# Patient Record
Sex: MALE | Race: WHITE
[De-identification: names, ages, dates, MRNs, and addresses within clinical notes are randomized per-mention and may not be internally consistent; named-entity substitution may affect disease eponyms.]

---

## 2017-07-08 ENCOUNTER — HOSPITAL ENCOUNTER (EMERGENCY)
Dept: HOSPITAL 75 - ER | Age: 27
Discharge: HOME | End: 2017-07-08
Payer: SELF-PAY

## 2017-07-08 VITALS — BODY MASS INDEX: 21.69 KG/M2 | WEIGHT: 135 LBS | HEIGHT: 66 IN

## 2017-07-08 VITALS — SYSTOLIC BLOOD PRESSURE: 104 MMHG | DIASTOLIC BLOOD PRESSURE: 69 MMHG

## 2017-07-08 DIAGNOSIS — Z23: ICD-10-CM

## 2017-07-08 DIAGNOSIS — L55.1: Primary | ICD-10-CM

## 2017-07-08 DIAGNOSIS — F17.210: ICD-10-CM

## 2017-07-08 DIAGNOSIS — F19.90: ICD-10-CM

## 2017-07-08 PROCEDURE — 90715 TDAP VACCINE 7 YRS/> IM: CPT

## 2017-07-08 PROCEDURE — 90471 IMMUNIZATION ADMIN: CPT

## 2017-07-08 PROCEDURE — 99284 EMERGENCY DEPT VISIT MOD MDM: CPT

## 2017-07-08 NOTE — ED INTEGUMENTARY GENERAL
General


Chief Complaint:  Skin/Wound Problems


Stated Complaint:  BLISTERS


Nursing Triage Note:  


Patient reports shooting something up his R upper arm 4 days ago and is still 


feeling like he is burning inside





Allergies and Home Medications


Allergies


Coded Allergies:  


     No Known Drug Allergies (Unverified , 7/8/17)





Home Medications


No Active Prescriptions or Reported Meds





Past Medical-Social-Family Hx


Patient Social History


Alcohol Use:  Denies Use


Recreational Drug Use:  Yes


Drug of Choice:  ANYTHING AND EVERYTHING


Smoking Status:  Current Everyday Smoker


Type Used:  Cigarettes


Recent Foreign Travel:  No


Contact w/Someone Who Travel:  No


Recent Infectious Disease Expo:  No





Physical Exam


Vital Signs





Vital Sign - Last 12Hours








 7/8/17





 16:56


 


Temp 98.2


 


Pulse 100


 


Resp 18


 


B/P (MAP) 104/69


 


Pulse Ox 97


 


O2 Delivery Room Air





Capillary Refill : Less Than 3 Seconds





Progress/Results/Core Measures


Results/Orders


My Orders





Orders - NIVIA PAGAN DO


Dipht,Pertuss([facility]),Tet Adult (Boostrix (7/8/17 17:30)





Vital Signs/I&O





Vital Sign - Last 12Hours








 7/8/17





 16:56


 


Temp 98.2


 


Pulse 100


 


Resp 18


 


B/P (MAP) 104/69


 


Pulse Ox 97


 


O2 Delivery Room Air














Blood Pressure Mean:  81











Departure


Impression


Impression:  


 Primary Impression:  


 Intravenous drug user


 Additional Impressions:  


 Diphtheria-pertussis-tetanus (DPT) vaccination administered at current visit


 Sunburn of second degree


 Sunburn of first degree


Disposition:  01 HOME, SELF-CARE


Condition:  Stable





Departure-Patient Inst.


Referrals:  


NO,LOCAL PHYSICIAN (PCP)


Primary Care Physician


Patient Instructions:  Diphtheria and Tetanus Toxoids, and Acellular Pertussis 

Vaccine, Drug Abuse and Drug Addiction (DC), MRSA (DC), Sunburn (DC)





Add. Discharge Instructions:  


COOL COMPRESSES TO SKIN





FOLLOW UP WITH  FOR FURTHER CARE





All discharge instructions reviewed with patient and/or family. Voiced 

understanding.


Scripts


Sulfamethoxazole/Trimethoprim (Bactrim Ds Tablet) 1 Each Tablet


1 EACH PO BID, #20 TAB


   Prov: NIVIA PAGAN DO         7/8/17











NIVIA PAGAN DO Jul 8, 2017 17:29

## 2019-11-26 ENCOUNTER — HOSPITAL ENCOUNTER (EMERGENCY)
Dept: HOSPITAL 75 - ER FS | Age: 29
Discharge: HOME | End: 2019-11-26
Payer: SELF-PAY

## 2019-11-26 VITALS — DIASTOLIC BLOOD PRESSURE: 89 MMHG | SYSTOLIC BLOOD PRESSURE: 141 MMHG

## 2019-11-26 VITALS — WEIGHT: 147.93 LBS | BODY MASS INDEX: 22.16 KG/M2 | HEIGHT: 68.5 IN

## 2019-11-26 DIAGNOSIS — G25.1: ICD-10-CM

## 2019-11-26 DIAGNOSIS — T50.995A: ICD-10-CM

## 2019-11-26 DIAGNOSIS — H57.89: Primary | ICD-10-CM

## 2019-11-26 DIAGNOSIS — F41.9: ICD-10-CM

## 2019-11-26 PROCEDURE — 99284 EMERGENCY DEPT VISIT MOD MDM: CPT

## 2019-11-26 PROCEDURE — 96372 THER/PROPH/DIAG INJ SC/IM: CPT

## 2019-11-26 NOTE — ED GENERAL
General


Stated Complaint:  ZEE EYE REDNESS; SHAKINESS


Source of Information:  Patient


Exam Limitations:  No Limitations





History of Present Illness


Date Seen by Provider:  Nov 26, 2019


Time Seen by Provider:  12:25


Initial Comments


The patient is a 29-year-old male presents for evaluation of bilateral eye 

redness, bilateral hand redness, shakiness, and anxiety. He states that shortly 

before symptoms began he took an over-the-counter male enhancement pill from a 

gas station. The brand is night bullet and it states that it has several 

ingredients including horny goat weed, ginseng, yohimbe, and others. After 

taking this pill he noticed the eye redness, shakiness, anxiety, and hand 

redness. He took a Benadryl at home with little relief. He also mentions that he

relapsed yesterday and abuse methamphetamines. He is alert and oriented 4, 

anxious, but appears to be in no distress this time. He denies any other 

complaints.


Timing/Duration:  1 Hour


Severity:  Mild


Associated Systoms:  Denies Symptoms





Allergies and Home Medications


Allergies


Coded Allergies:  


     No Known Drug Allergies (Unverified , 7/8/17)





Home Medications


Sulfamethoxazole/Trimethoprim 1 Each Tablet, 1 EACH PO BID


   Prescribed by: NIVIA PAGAN on 7/8/17 8093





Patient Home Medication List


Home Medication List Reviewed:  Yes





Review of Systems


Review of Systems


Constitutional:  no symptoms reported


EENTM:  other (eye redness, no pain)


Respiratory:  no symptoms reported


Cardiovascular:  no symptoms reported


Gastrointestinal:  no symptoms reported


Genitourinary:  no symptoms reported


Musculoskeletal:  no symptoms reported


Skin:  no symptoms reported


Psychiatric/Neurological:  Anxiety


Hematologic/Lymphatic:  No Symptoms Reported


Immunological/Allergic:  no symptoms reported





All Other Systems Reviewed


Negative Unless Noted:  Yes





Past Medical-Social-Family Hx


Past Med/Social Hx:  Reviewed Nursing Past Med/Soc Hx


Patient Social History


Recreational Drug Use:  Yes (meth)


Drug of Choice:  ANYTHING AND EVERYTHING


Type Used:  Cigarettes


Recent Foreign Travel:  No





Immunizations Up To Date


Tetanus Booster (TDap):  Unknown





Past Medical History


Surgeries:  No


Respiratory:  No


Cardiac:  No


Neurological:  No


Genitourinary:  No


Gastrointestinal:  No


Musculoskeletal:  No


Endocrine:  No


HEENT:  No


Cancer:  No


Psychosocial:  No


Integumentary:  No


Blood Disorders:  No





Physical Exam


Vital Signs





Vital Signs - First Documented








 11/26/19





 12:25


 


Temp 37.0


 


Pulse 128


 


Resp 22


 


B/P (MAP) 102/65 (77)


 


Pulse Ox 98


 


O2 Delivery Room Air





Capillary Refill :


Height, Weight, BMI


Height: 5'6.00"


Weight: 135lbs. oz. 61.157267xs;  BMI


Method:Stated


General Appearance:  No Apparent Distress, WD/WN, Anxious


Eyes:  Bilateral Eye Other (b/l conjunctival injection)


HEENT:  PERRL/EOMI, Pharynx Normal


Neck:  Full Range of Motion, Normal Inspection, Non Tender


Respiratory:  Chest Non Tender, Lungs Clear, Normal Breath Sounds, No Accessory 

Muscle Use


Cardiovascular:  Regular Rate, Rhythm, No Edema, No Murmur


Gastrointestinal:  Normal Bowel Sounds, Non Tender, Soft


Back:  Normal Inspection, No CVA Tenderness, No Vertebral Tenderness


Extremity:  Normal Capillary Refill, Non Tender, No Calf Tenderness


Neurologic/Psychiatric:  Alert, Oriented x3, No Motor/Sensory Deficits, CNs II-

XII Norm as Tested


Skin:  Normal Color, Warm/Dry





Progress/Results/Core Measures


Suspected Sepsis


SIRS


Temperature: 


Pulse:  


Respiratory Rate: 


 


Blood Pressure  / 


Mean:





Results/Orders


My Orders





Orders - BERNA KOO DO


Dexamethasone Injection (Decadron Inject (11/26/19 12:30)


Diphenhydramine Tablet (Benadryl Tablet) (11/26/19 12:30)


Famotidine Tablet (Pepcid Tablet) (11/26/19 12:30)


Alprazolam Tablet (Xanax Tablet) (11/26/19 12:30)


Alprazolam Tablet (Xanax Tablet) (11/26/19 12:42)





Medications Given in ED





Current Medications








 Medications  Dose


 Ordered  Sig/Evelia


 Route  Start Time


 Stop Time Status Last Admin


Dose Admin


 


 Alprazolam  1 mg  ONCE  ONCE


 PO  11/26/19 12:30


 11/26/19 12:31 DC 11/26/19 12:54


1 MG


 


 Dexamethasone


 Sodium Phosphate  10 mg  ONCE  ONCE


 IM  11/26/19 12:30


 11/26/19 12:31 DC 11/26/19 12:54


10 MG


 


 Diphenhydramine


 HCl  25 mg  ONCE  ONCE


 PO  11/26/19 12:30


 11/26/19 12:31 DC 11/26/19 12:54


25 MG


 


 Famotidine  20 mg  ONCE  ONCE


 PO  11/26/19 12:30


 11/26/19 12:31 DC 11/26/19 12:54


20 MG








Vital Signs/I&O











 11/26/19





 12:25


 


Temp 37.0


 


Pulse 128


 


Resp 22


 


B/P (MAP) 102/65 (77)


 


Pulse Ox 98


 


O2 Delivery Room Air





Capillary Refill :


Progress Note :  


Progress Note


@1332 - The patient states he is feeling much better at this time and has no new

complaints. Workup fails reveal any emergent pathology. Strongly advised the 

patient not take any more over-the-counter supplements similar to what he took. 

Also advised the patient stop abusing methamphetamines. The patient expresses 

verbal understanding and agreement with the plan and is stable for discharge 

home.





Departure


Impression





   Primary Impression:  


   Drug side effects


Disposition:  01 HOME, SELF-CARE


Condition:  Stable





Departure-Patient Inst.


Decision time for Depature:  13:35


Referrals:  


NO,LOCAL PHYSICIAN (PCP/Family)


Primary Care Physician


Patient Instructions:  Adverse Drug Reactions, Adult (DC)





Add. Discharge Instructions:  


Follow-up with your doctor in the next 1-2 days. Return to the ER for new or 

worsening symptoms. Do not take anymore over-the-counter similar supplements.











BERNA KOO DO              Nov 26, 2019 12:39


POS

## 2021-10-18 ENCOUNTER — HOSPITAL ENCOUNTER (EMERGENCY)
Dept: HOSPITAL 75 - ER FS | Age: 31
Discharge: HOME | End: 2021-10-18
Payer: SELF-PAY

## 2021-10-18 VITALS — WEIGHT: 164.91 LBS | BODY MASS INDEX: 24.99 KG/M2 | HEIGHT: 67.99 IN

## 2021-10-18 VITALS — SYSTOLIC BLOOD PRESSURE: 128 MMHG | DIASTOLIC BLOOD PRESSURE: 72 MMHG

## 2021-10-18 DIAGNOSIS — S50.811A: Primary | ICD-10-CM

## 2021-10-18 DIAGNOSIS — L03.113: ICD-10-CM
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
## 2021-10-18 DIAGNOSIS — F17.210: ICD-10-CM

## 2021-10-18 LAB
ALBUMIN SERPL-MCNC: 4.1 GM/DL (ref 3.2–4.5)
ALP SERPL-CCNC: 71 U/L (ref 40–136)
ALT SERPL-CCNC: 43 U/L (ref 0–55)
APTT BLD: 26 SEC (ref 24–35)
BASOPHILS # BLD AUTO: 0.1 10^3/UL (ref 0–0.1)
BASOPHILS NFR BLD AUTO: 1 % (ref 0–10)
BILIRUB SERPL-MCNC: 0.4 MG/DL (ref 0.1–1)
BUN/CREAT SERPL: 10
CALCIUM SERPL-MCNC: 9.5 MG/DL (ref 8.5–10.1)
CHLORIDE SERPL-SCNC: 100 MMOL/L (ref 98–107)
CO2 SERPL-SCNC: 30 MMOL/L (ref 21–32)
CREAT SERPL-MCNC: 0.9 MG/DL (ref 0.6–1.3)
D DIMER PPP FEU-MCNC: 1.01 UG/ML (ref 0–0.49)
EOSINOPHIL # BLD AUTO: 0.4 10^3/UL (ref 0–0.3)
EOSINOPHIL NFR BLD AUTO: 4 % (ref 0–10)
GFR SERPLBLD BASED ON 1.73 SQ M-ARVRAT: 98 ML/MIN
GLUCOSE SERPL-MCNC: 100 MG/DL (ref 70–105)
HCT VFR BLD CALC: 44 % (ref 40–54)
HGB BLD-MCNC: 15.1 G/DL (ref 13.3–17.7)
INR PPP: 0.9 (ref 0.8–1.4)
LYMPHOCYTES # BLD AUTO: 1.6 X 10^3 (ref 1–4)
LYMPHOCYTES NFR BLD AUTO: 14 % (ref 12–44)
MANUAL DIFFERENTIAL PERFORMED BLD QL: NO
MCH RBC QN AUTO: 29 PG (ref 25–34)
MCHC RBC AUTO-ENTMCNC: 34 G/DL (ref 32–36)
MCV RBC AUTO: 86 FL (ref 80–99)
MONOCYTES # BLD AUTO: 1 X 10^3 (ref 0–1)
MONOCYTES NFR BLD AUTO: 9 % (ref 0–12)
NEUTROPHILS # BLD AUTO: 8 X 10^3 (ref 1.8–7.8)
NEUTROPHILS NFR BLD AUTO: 72 % (ref 42–75)
PLATELET # BLD: 244 10^3/UL (ref 130–400)
PMV BLD AUTO: 11.1 FL (ref 9–12.2)
POTASSIUM SERPL-SCNC: 3.5 MMOL/L (ref 3.6–5)
PROT SERPL-MCNC: 6.9 GM/DL (ref 6.4–8.2)
PROTHROMBIN TIME: 12.9 SEC (ref 12.2–14.7)
SODIUM SERPL-SCNC: 140 MMOL/L (ref 135–145)
WBC # BLD AUTO: 11.1 10^3/UL (ref 4.3–11)

## 2021-10-18 PROCEDURE — 85025 COMPLETE CBC W/AUTO DIFF WBC: CPT

## 2021-10-18 PROCEDURE — 73090 X-RAY EXAM OF FOREARM: CPT

## 2021-10-18 PROCEDURE — 87070 CULTURE OTHR SPECIMN AEROBIC: CPT

## 2021-10-18 PROCEDURE — 87040 BLOOD CULTURE FOR BACTERIA: CPT

## 2021-10-18 PROCEDURE — 85730 THROMBOPLASTIN TIME PARTIAL: CPT

## 2021-10-18 PROCEDURE — 85379 FIBRIN DEGRADATION QUANT: CPT

## 2021-10-18 PROCEDURE — 80053 COMPREHEN METABOLIC PANEL: CPT

## 2021-10-18 PROCEDURE — 36415 COLL VENOUS BLD VENIPUNCTURE: CPT

## 2021-10-18 PROCEDURE — 87205 SMEAR GRAM STAIN: CPT

## 2021-10-18 PROCEDURE — 85610 PROTHROMBIN TIME: CPT

## 2021-10-18 PROCEDURE — 83605 ASSAY OF LACTIC ACID: CPT
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
## 2022-06-12 ENCOUNTER — HOSPITAL ENCOUNTER (EMERGENCY)
Dept: HOSPITAL 75 - ER FS | Age: 32
Discharge: HOME | End: 2022-06-12
Payer: SELF-PAY

## 2022-06-12 VITALS — HEIGHT: 68.9 IN | BODY MASS INDEX: 25.8 KG/M2 | WEIGHT: 174.17 LBS

## 2022-06-12 VITALS — SYSTOLIC BLOOD PRESSURE: 104 MMHG | DIASTOLIC BLOOD PRESSURE: 67 MMHG

## 2022-06-12 DIAGNOSIS — F15.10: ICD-10-CM

## 2022-06-12 DIAGNOSIS — T78.40XA: Primary | ICD-10-CM

## 2022-06-12 PROCEDURE — 99284 EMERGENCY DEPT VISIT MOD MDM: CPT
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
## 2023-05-24 ENCOUNTER — HOSPITAL ENCOUNTER (EMERGENCY)
Dept: HOSPITAL 75 - ER | Age: 33
Discharge: HOME | End: 2023-05-24
Payer: SELF-PAY

## 2023-05-24 VITALS — SYSTOLIC BLOOD PRESSURE: 105 MMHG | DIASTOLIC BLOOD PRESSURE: 70 MMHG

## 2023-05-24 DIAGNOSIS — R07.89: Primary | ICD-10-CM

## 2023-05-24 DIAGNOSIS — F17.210: ICD-10-CM

## 2023-05-24 LAB
ALBUMIN SERPL-MCNC: 4 GM/DL (ref 3.2–4.5)
ALP SERPL-CCNC: 63 U/L (ref 40–136)
ALT SERPL-CCNC: 21 U/L (ref 0–55)
BASOPHILS # BLD AUTO: 0.1 10^3/UL (ref 0–0.1)
BASOPHILS NFR BLD AUTO: 1 % (ref 0–10)
BILIRUB SERPL-MCNC: 0.6 MG/DL (ref 0.1–1)
BUN/CREAT SERPL: 19
CALCIUM SERPL-MCNC: 9.1 MG/DL (ref 8.5–10.1)
CHLORIDE SERPL-SCNC: 103 MMOL/L (ref 98–107)
CO2 SERPL-SCNC: 28 MMOL/L (ref 21–32)
CREAT SERPL-MCNC: 1.13 MG/DL (ref 0.6–1.3)
EOSINOPHIL # BLD AUTO: 1.5 10^3/UL (ref 0–0.3)
EOSINOPHIL NFR BLD AUTO: 14 % (ref 0–10)
EOSINOPHIL NFR BLD MANUAL: 13 %
GFR SERPLBLD BASED ON 1.73 SQ M-ARVRAT: 87 ML/MIN
GLUCOSE SERPL-MCNC: 98 MG/DL (ref 70–105)
HCT VFR BLD CALC: 41 % (ref 40–54)
HGB BLD-MCNC: 14.6 G/DL (ref 13.3–17.7)
LYMPHOCYTES # BLD AUTO: 2.3 10^3/UL (ref 1–4)
LYMPHOCYTES NFR BLD AUTO: 22 % (ref 12–44)
MANUAL DIFFERENTIAL PERFORMED BLD QL: YES
MCH RBC QN AUTO: 30 PG (ref 25–34)
MCHC RBC AUTO-ENTMCNC: 36 G/DL (ref 32–36)
MCV RBC AUTO: 84 FL (ref 80–99)
MONOCYTES # BLD AUTO: 0.9 10^3/UL (ref 0–1)
MONOCYTES NFR BLD AUTO: 9 % (ref 0–12)
MONOCYTES NFR BLD: 6 %
NEUTROPHILS # BLD AUTO: 5.5 10^3/UL (ref 1.8–7.8)
NEUTROPHILS NFR BLD AUTO: 54 % (ref 42–75)
NEUTS BAND NFR BLD MANUAL: 48 %
PLATELET # BLD EST: NORMAL 10*3/UL
PLATELET # BLD: 247 10^3/UL (ref 130–400)
PMV BLD AUTO: 10.6 FL (ref 9–12.2)
POTASSIUM SERPL-SCNC: 3.3 MMOL/L (ref 3.6–5)
PROT SERPL-MCNC: 6.7 GM/DL (ref 6.4–8.2)
RBC MORPH BLD: NORMAL
SODIUM SERPL-SCNC: 140 MMOL/L (ref 135–145)
VARIANT LYMPHS NFR BLD MANUAL: 1 %
VARIANT LYMPHS NFR BLD MANUAL: 32 %
WBC # BLD AUTO: 10.2 10^3/UL (ref 4.3–11)

## 2023-05-24 PROCEDURE — 85007 BL SMEAR W/DIFF WBC COUNT: CPT

## 2023-05-24 PROCEDURE — 84484 ASSAY OF TROPONIN QUANT: CPT

## 2023-05-24 PROCEDURE — 71046 X-RAY EXAM CHEST 2 VIEWS: CPT

## 2023-05-24 PROCEDURE — 36415 COLL VENOUS BLD VENIPUNCTURE: CPT

## 2023-05-24 PROCEDURE — 85027 COMPLETE CBC AUTOMATED: CPT

## 2023-05-24 PROCEDURE — 93005 ELECTROCARDIOGRAM TRACING: CPT

## 2023-05-24 PROCEDURE — 80053 COMPREHEN METABOLIC PANEL: CPT

## 2023-05-24 NOTE — DIAGNOSTIC IMAGING REPORT
INDICATION:  

Chest pain and dyspnea.



TECHNIQUE: 

PA and lateral views of the chest were obtained.



COMPARISON: 

No previous study is available for comparison at this time.



FINDINGS: 

The heart size and pulmonary vasculature are within normal

limits. The lungs are clear bilaterally.  



IMPRESSION: 

Unremarkable chest.   



 



Dictated by: 



  Dictated on workstation # [id number]

## 2023-05-24 NOTE — ED GENERAL
General


Chief Complaint:  Chest Pain


Stated Complaint:  CHEST PAIN


Nursing Triage Note:  


PT AMB TO [facility] WITH CC OF L SIDE CHEST PAIN X 1 WEEK. PT STATES CHEST PAIN COMES




AND GOES AND INCREASES WITH MOVEMENT. DENIES CARDIAC HX. PT REPORTS SOA. PT 


STATES WAS SEEN 2 DAYS AGO AT PCP FOR SAME CONCERN. CHEST PAIN INCREASE WITH 


PALPATATION. PT A&OX4


Source of Information:  Patient


Exam Limitations:  No Limitations





History of Present Illness


Date Seen by Provider:  May 24, 2023


Time Seen by Provider:  16:13


Initial Comments


33-year-old male presents to the emergency department today for chest pain.  He 

states symptoms present for 1 week.  It is sharp stabbing and it feels like 

"something is broke" in his left chest.  He states he fell down preceding the 

event but braced himself with his arm and did not really hit his chest.  He 

denies any fevers or chills.  He was coughing last week but is not coughing now.

 No known cardiac history.  He did see a doctor at the walk-in clinic 2 days 

into his symptoms and he had an EKG which was reportedly normal.





All other systems reviewed and negative except documented per HPI.





Voice recognition software was used to help create this chart





Allergies and Home Medications


Allergies


Coded Allergies:  


     No Known Drug Allergies (Unverified , 7/8/17)





Patient Home Medication List


Home Medication List Reviewed:  Yes


Diphenhydramine HCl (Diphenhydramine HCl) 50 Mg Capsule, 50 MG PO Q4H PRN for 

rash/hives


   Prescribed by: FLACO WANG on 6/12/22 0111


Hydrocodone/Acetaminophen (Hydrocodone-Acetamin 5-325 mg) 1 Each Tablet, 1 TAB 

PO Q6H PRN for PAIN-SEVERE (8-10)


   Prescribed by: FLACO WANG on 10/18/21 1804


Ibuprofen (Ibuprofen) 800 Mg Tablet, 800 MG PO Q8H PRN for PAIN


   Prescribed by: FLACO WANG on 10/18/21 1803


Prednisone (Prednisone) 20 Mg Tab, 40 MG PO DAILY


   Prescribed by: FLACO WANG on 6/12/22 0111


Sulfamethoxazole/Trimethoprim (Bactrim Ds Tablet) 1 Each Tablet, 1 EACH PO BID


   Prescribed by: FLACO WANG on 10/18/21 1803





Review of Systems


Review of Systems


Constitutional:  see HPI





Past Medical-Social-Family Hx


Patient Social History


Tobacco Use?:  Yes


Tobacco type used:  Cigarettes


Substance use?:  Yes


Substance type:  Methamphetamine


Substance frequency:  Couple times a week


Alcohol Use?:  No


Pt feels they are or have been:  No





Immunizations Up To Date


Tetanus Booster (TDap):  Unknown


Influenza Vaccine Up-to-Date:  No; Not Current





Seasonal Allergies


Seasonal Allergies:  No





Past Medical History


Surgery/Hospitalization HX:  


none


Surgeries:  No


Respiratory:  No


Cardiac:  No


Neurological:  No


Genitourinary:  No


Gastrointestinal:  No


Musculoskeletal:  No


Endocrine:  No


HEENT:  No


Cancer:  No


Psychosocial:  No


Integumentary:  No


Blood Disorders:  No





Physical Exam


Vital Signs





Vital Signs - First Documented








 5/24/23





 16:08


 


Temp 36.4


 


Pulse 89


 


Resp 12


 


B/P (MAP) 113/84 (94)


 


Pulse Ox 98


 


O2 Delivery Room Air





Capillary Refill : Less Than 3 Seconds


Height, Weight, BMI


Height: 5'6.00"


Weight: 135lbs. oz. 61.342431cc; 25.00 BMI


Method:Stated


General Appearance:  No Apparent Distress, WD/WN


HEENT:  Normal ENT Inspection, Pharynx Normal


Neck:  Full Range of Motion, Non Tender, Supple


Respiratory:  Lungs Clear, Normal Breath Sounds, No Accessory Muscle Use, No 

Respiratory Distress, Other (Tenderness palpation left mid sternal region.  No 

crepitus or deformity.  No bruising.)


Cardiovascular:  Regular Rate, Rhythm, Normal Peripheral Pulses


Gastrointestinal:  Normal Bowel Sounds, No Organomegaly, Non Tender, Soft


Extremity:  Normal Capillary Refill, Non Tender, No Calf Tenderness, No Pedal 

Edema


Neurologic/Psychiatric:  Alert, Oriented x3


Skin:  Normal Color, Warm/Dry





Progress/Results/Core Measures


Suspected Sepsis


SIRS


Temperature: 


Pulse: 89 


Respiratory Rate: 12


 


Laboratory Tests


5/24/23 16:12: White Blood Count 10.2


Blood Pressure 113 /84 


Mean: 94


 





Laboratory Tests


5/24/23 16:12: 


Creatinine 1.13, Platelet Count 247, Total Bilirubin 0.6








Results/Orders


Lab Results





Laboratory Tests








Test


 5/24/23


16:12 Range/Units


 


 


White Blood Count


 10.2 


 4.3-11.0


10^3/uL


 


Red Blood Count


 4.90 


 4.30-5.52


10^6/uL


 


Hemoglobin 14.6  13.3-17.7  g/dL


 


Hematocrit 41  40-54  %


 


Mean Corpuscular Volume 84  80-99  fL


 


Mean Corpuscular Hemoglobin 30  25-34  pg


 


Mean Corpuscular Hemoglobin


Concent 36 


 32-36  g/dL





 


Red Cell Distribution Width 13.2  10.0-14.5  %


 


Platelet Count


 247 


 130-400


10^3/uL


 


Mean Platelet Volume 10.6  9.0-12.2  fL


 


Immature Granulocyte % (Auto) 0   %


 


Neutrophils (%) (Auto) 54  42-75  %


 


Lymphocytes (%) (Auto) 22  12-44  %


 


Monocytes (%) (Auto) 9  0-12  %


 


Eosinophils (%) (Auto) 14 H 0-10  %


 


Basophils (%) (Auto) 1  0-10  %


 


Neutrophils # (Auto)


 5.5 


 1.8-7.8


10^3/uL


 


Lymphocytes # (Auto)


 2.3 


 1.0-4.0


10^3/uL


 


Monocytes # (Auto)


 0.9 


 0.0-1.0


10^3/uL


 


Eosinophils # (Auto)


 1.5 H


 0.0-0.3


10^3/uL


 


Basophils # (Auto)


 0.1 


 0.0-0.1


10^3/uL


 


Immature Granulocyte # (Auto)


 0.0 


 0.0-0.1


10^3/uL


 


Neutrophils % (Manual) 48   %


 


Lymphocytes % (Manual) 32   %


 


Monocytes % (Manual) 6   %


 


Eosinophils % (Manual) 13   %


 


Atypical Lymphocytes 1   %


 


Platelet Estimate NORMAL   


 


Blood Morphology Comment NORMAL   


 


Sodium Level 140  135-145  MMOL/L


 


Potassium Level 3.3 L 3.6-5.0  MMOL/L


 


Chloride Level 103    MMOL/L


 


Carbon Dioxide Level 28  21-32  MMOL/L


 


Anion Gap 9  5-14  MMOL/L


 


Blood Urea Nitrogen 21 H 7-18  MG/DL


 


Creatinine


 1.13 


 0.60-1.30


MG/DL


 


Estimat Glomerular Filtration


Rate 87 


  





 


BUN/Creatinine Ratio 19   


 


Glucose Level 98    MG/DL


 


Calcium Level 9.1  8.5-10.1  MG/DL


 


Corrected Calcium 9.1  8.5-10.1  MG/DL


 


Total Bilirubin 0.6  0.1-1.0  MG/DL


 


Aspartate Amino Transf


(AST/SGOT) 26 


 5-34  U/L





 


Alanine Aminotransferase


(ALT/SGPT) 21 


 0-55  U/L





 


Alkaline Phosphatase 63    U/L


 


Troponin I < 0.028  <0.028  NG/ML


 


Total Protein 6.7  6.4-8.2  GM/DL


 


Albumin 4.0  3.2-4.5  GM/DL








My Orders





Orders - MANISH MEDRANO DO


Ekg Tracing (5/24/23 16:08)


Comprehensive Metabolic Panel (5/24/23 16:21)


Troponin I [place] (5/24/23 16:21)


Chest Pa/Lat (2 View) (5/24/23 16:21)


Cbc With Automated Diff (5/24/23 16:21)


Ketorolac Injection (Toradol Injection) (5/24/23 16:30)


Manual Differential (5/24/23 16:12)





Medications Given in ED





Current Medications








 Medications  Dose


 Ordered  Sig/Evelia


 Route  Start Time


 Stop Time Status Last Admin


Dose Admin


 


 Ketorolac


 Tromethamine  15 mg  ONCE  ONCE


 IM  5/24/23 16:30


 5/24/23 16:31 DC 5/24/23 16:28


15 MG








Vital Signs/I&O











 5/24/23





 16:08


 


Temp 36.4


 


Pulse 89


 


Resp 12


 


B/P (MAP) 113/84 (94)


 


Pulse Ox 98


 


O2 Delivery Room Air





Capillary Refill : Less Than 3 Seconds








Blood Pressure Mean:                    94











ECG


Comment


Sinus rhythm with a rate of 77 bpm.  Normal intervals.  Normal axis.  No ST or T

wave abnormalities.  No ectopy.  No STEMI.





Departure


Communication (Admissions)


Patient is hemodynamically stable.  Heart score is 2.  EKG is nonischemic and 

troponin is negative.  No evidence for ACS.  No evidence for pneumonia, 

pneumothorax.  Symptoms most consistent with chest wall pain related to 

musculoskeletal cause.  Toradol improved his symptoms some.  He is discharged 

with p.o. Toradol.





Impression





   Primary Impression:  


   Chest wall pain


Disposition:  01 HOME, SELF-CARE


Condition:  Stable





Departure-Patient Inst.


Referrals:  


NO,LOCAL PHYSICIAN (PCP/Family)


Primary Care Physician


Patient Instructions:  Acute Pain, Adult (DC)





Add. Discharge Instructions:  


There is no indication that this is coming from your heart at this time.  

Follow-up with your primary doctor should your symptoms persist.  Take the pain 

medication as prescribed as needed.  Do not take it on an empty stomach.  Do not

take any other anti-inflammatory medicines while taking it.  Return to the [facility] department for any severe concerns.





All discharge instructions reviewed with patient and/or family. Voiced 

understanding.


Scripts


Ketorolac Tromethamine (Ketorolac Tromethamine) 10 Mg Tablet


10 MG PO TID for Pain for 3 Days, #9 TAB


   Prov: MANISH MEDRANO DO         5/24/23











MANISH MEDRANO DO            May 24, 2023 16:22